# Patient Record
Sex: MALE
[De-identification: names, ages, dates, MRNs, and addresses within clinical notes are randomized per-mention and may not be internally consistent; named-entity substitution may affect disease eponyms.]

---

## 2023-02-14 ENCOUNTER — NON-APPOINTMENT (OUTPATIENT)
Age: 68
End: 2023-02-14

## 2023-02-15 ENCOUNTER — APPOINTMENT (OUTPATIENT)
Dept: UROLOGY | Facility: CLINIC | Age: 68
End: 2023-02-15
Payer: SELF-PAY

## 2023-02-15 VITALS
HEIGHT: 69 IN | WEIGHT: 215 LBS | TEMPERATURE: 98.2 F | SYSTOLIC BLOOD PRESSURE: 143 MMHG | BODY MASS INDEX: 31.84 KG/M2 | HEART RATE: 71 BPM | DIASTOLIC BLOOD PRESSURE: 81 MMHG

## 2023-02-15 PROCEDURE — 99204 OFFICE O/P NEW MOD 45 MIN: CPT

## 2023-02-15 NOTE — HISTORY OF PRESENT ILLNESS
[FreeTextEntry1] : 68yo male visiting from [place], here for second opinion for elevated PSA, BPH, and erectile dysfunction. PSA has historically been around 3.5-4, most recently over 5. Was advised to have MRI but can't get it before April in [place]. \par \par Also with ED. Was taking Cialis 5mg then switched 20mg needed. Has not tried Viagra. Unable to get erection with Cialis. Libido is normal. \par \par BPH on alfuzosin daily. Nocturia 1-3 times per night. Was told prostate is enlarged based on US in [place]. Symptoms not bothersome. \par \par Recently gained about 15 lbs over last 3 momnths [Nocturia] : nocturia [Erectile Dysfunction] : Erectile Dysfunction [None] : None

## 2023-02-15 NOTE — REVIEW OF SYSTEMS
[Feeling Poorly] : not feeling poorly [Feeling Tired] : not feeling tired [Recent Weight Gain (___ Lbs)] : recent [unfilled] ~Ulb weight gain [Nocturia] : nocturia [Erectile Dysfunction] : erectile dysfunction [Negative] : Psychiatric

## 2023-02-15 NOTE — ASSESSMENT
[FreeTextEntry1] : 68yo male, lives in [place] full time, visiting [facility] and interested in 2nd opinion for urological complaints\par \par Elevated PSA over 5\par Agree with MRI\par Discussed MRI indications and biopsy indications \par \par BPH on alfuzosin \par He would like to continue this \par \par ED, poor response to Cialis prn\par He will try Cialis daily \par Also discussed Viagra prn or intracavernosal injections\par \par F/u prn

## 2023-07-12 ENCOUNTER — APPOINTMENT (OUTPATIENT)
Dept: UROLOGY | Facility: CLINIC | Age: 68
End: 2023-07-12
Payer: SELF-PAY

## 2023-07-12 VITALS
DIASTOLIC BLOOD PRESSURE: 72 MMHG | TEMPERATURE: 98.1 F | WEIGHT: 215 LBS | BODY MASS INDEX: 32.58 KG/M2 | SYSTOLIC BLOOD PRESSURE: 136 MMHG | HEIGHT: 68 IN

## 2023-07-12 DIAGNOSIS — Z00.00 ENCOUNTER FOR GENERAL ADULT MEDICAL EXAMINATION W/OUT ABNORMAL FINDINGS: ICD-10-CM

## 2023-07-12 DIAGNOSIS — Z87.438 PERSONAL HISTORY OF OTHER DISEASES OF MALE GENITAL ORGANS: ICD-10-CM

## 2023-07-12 DIAGNOSIS — N52.02 CORPORO-VENOUS OCCLUSIVE ERECTILE DYSFUNCTION: ICD-10-CM

## 2023-07-12 DIAGNOSIS — Z80.1 FAMILY HISTORY OF MALIGNANT NEOPLASM OF TRACHEA, BRONCHUS AND LUNG: ICD-10-CM

## 2023-07-12 DIAGNOSIS — N48.89 OTHER SPECIFIED DISORDERS OF PENIS: ICD-10-CM

## 2023-07-12 DIAGNOSIS — R97.20 ELEVATED PROSTATE, SPECIFIC ANTIGEN [PSA]: ICD-10-CM

## 2023-07-12 DIAGNOSIS — N52.9 MALE ERECTILE DYSFUNCTION, UNSPECIFIED: ICD-10-CM

## 2023-07-12 DIAGNOSIS — N48.6 INDURATION PENIS PLASTICA: ICD-10-CM

## 2023-07-12 DIAGNOSIS — N13.8 BENIGN PROSTATIC HYPERPLASIA WITH LOWER URINARY TRACT SYMPMS: ICD-10-CM

## 2023-07-12 DIAGNOSIS — N40.1 BENIGN PROSTATIC HYPERPLASIA WITH LOWER URINARY TRACT SYMPMS: ICD-10-CM

## 2023-07-12 PROCEDURE — 99205 OFFICE O/P NEW HI 60 MIN: CPT | Mod: 25

## 2023-07-12 PROCEDURE — 93980 PENILE VASCULAR STUDY: CPT

## 2023-07-12 PROCEDURE — 54235 NJX CORPORA CAVERNOSA RX AGT: CPT

## 2023-07-12 RX ORDER — ALPROSTADIL 20 UG/ML
20 INJECTION, POWDER, LYOPHILIZED, FOR SOLUTION INTRACAVERNOUS
Qty: 6 | Refills: 11 | Status: ACTIVE | COMMUNITY
Start: 2023-07-12 | End: 1900-01-01

## 2023-07-12 RX ORDER — ALFUZOSIN HYDROCHLORIDE 10 MG/1
10 TABLET, EXTENDED RELEASE ORAL
Refills: 0 | Status: ACTIVE | COMMUNITY

## 2023-07-12 RX ORDER — TADALAFIL 5 MG/1
5 TABLET, FILM COATED ORAL
Refills: 0 | Status: ACTIVE | COMMUNITY

## 2023-07-13 NOTE — PHYSICAL EXAM
[General Appearance - Well Developed] : well developed [General Appearance - Well Nourished] : well nourished [Normal Appearance] : normal appearance [Well Groomed] : well groomed [General Appearance - In No Acute Distress] : no acute distress [Edema] : no peripheral edema [Respiration, Rhythm And Depth] : normal respiratory rhythm and effort [Exaggerated Use Of Accessory Muscles For Inspiration] : no accessory muscle use [Abdomen Soft] : soft [Abdomen Tenderness] : non-tender [Costovertebral Angle Tenderness] : no ~M costovertebral angle tenderness [Urethral Meatus] : meatus normal [Penis Abnormality] : normal circumcised penis [Urinary Bladder Findings] : the bladder was normal on palpation [Scrotum] : the scrotum was normal [Testes Mass (___cm)] : there were no testicular masses [No Prostate Nodules] : no prostate nodules [Normal Station and Gait] : the gait and station were normal for the patient's age [] : no rash [No Focal Deficits] : no focal deficits [Oriented To Time, Place, And Person] : oriented to person, place, and time [Affect] : the affect was normal [Mood] : the mood was normal [Not Anxious] : not anxious [No Palpable Adenopathy] : no palpable adenopathy [FreeTextEntry1] : Patient has a plaque which is palpable on the dorsal of the penis midshaft.

## 2023-07-13 NOTE — HISTORY OF PRESENT ILLNESS
[FreeTextEntry1] : Patient is a 68-year-old gentleman with chief complaint of total erectile dysfunction.  Last time he achieve intercourse was in July 2018.  Patient describes his erection is full penis no hardness no penetration.  The patient is able to have a partial erection on awakening.  He states that this erection is worse than sexually induced erections.  The patient has a score of 2 on the sexual health inventory for men.  He is heterosexual  patient has tried Viagra Cialis and Levitra at maximum dose without any benefit.  More recently he was noted to have an elevation of his PSA with a P RADS core of 3 on the MRI and had a prostate biopsy and awaiting the results.

## 2023-07-13 NOTE — ASSESSMENT
[FreeTextEntry1] : The patient scheduled this consultation to discuss the different treatment options available for his organic erectile dysfunction. The following note describes the conversation that was performed today during the consultation. \par \par  I reviewed the Patient History Form which the patient filled out, made sure that his ailment was organic erectile dysfunction and I discussed in detail with him all previously tried treatments for his ED. We had a thorough discussion about all of the alternatives available, and I made sure to include in our discussion pills such as Levitra/Viagra/Cialis, as well as penile self injectable therapies, MUSE (Medicated Urethral System for Erection), vacuum device, and penile implant.  I stressed the risks and benefits and pros and cons of each of these options extensively. A power point presentation was also used to illustrate each treatment option. The patient was also provided with a packet of written information as well as a list of patients to speak to on the phone.  \par \par In discussing penile implant surgery, the patient was made aware of the different types of penile implants- including semirigid devices, 2-piece or Ambicor (AMS) devices, and the inflatable penile prosthesis with 3 components. I went on to mention that there are 2 brands of devices, Coloplast and AMS, and that the AMS is impregnated with antibiotic (inhibizone), and the Coloplast is dipped then coated with an antibiotic. I also referred him to my website in order to obtain additional information about the types of implants available.  He felt he would defer to my judgment as to which device to use.\par \par I also described the highlights and benefits of the "No-Touch" surgical technique and outcome data including number of procedures previously performed and updated rate of infection. In this initial discussion of the penile implant option, I made sure we had a very long and evette discussion about the risks.  I stated that, first and foremost, infection is the most dreaded risk and complication, which range in incidence from 1-3% of all cases performed in the [place], but that in my hands, using the "No-Touch" technique the incidence of infection is less than 1%.  I stated that should infection occur, the entire device would need to be removed, which typically happens in the first several weeks after surgery.  I explained that should this occur, there would likely be corporal fibrosis, scarring, penile shortening and even penile necrosis and disfigurement.  I said that while I would do absolutely everything possible to reduce and mitigate this risk, if it occur, the device would have to be removed, and then a salvage procedure with a semi-rigid implant possibly done, or the device would have to be removed with delayed re-implantation, or simply avoid future surgery completely.  I explained what this salvage procedure is, and that a new implant could be placed in the same setting with a complex irrigation of antibiotics and saline lavage, but that the infection risk at this salvage procedure is even higher, up to 30%. Furthermore the possible need for hospitalization, prolonged intravenous antibiotics and need further additional surgery was also discussed.  The patient was informed that if the salvage operation failed or if the infected implant were to be removed completely that significant shortening of the penis would occur making implantation of another device very difficult with very poor outcome and patient satisfaction. I explained that this is a real and significant risk that has to be weighed and considered.\par \par Next I expounded on the other risks of the operation.  These include injury to the urethra or bladder, and should these occur, the operation would have to be altered or aborted.  I explained that very rarely, vascular injury and bleeding can happen, and if iliac vein injury occurs from reservoir placement, this could be catastrophic and result in major blood loss and theoretically risk of leg loss in severe instances.  \par \par I went on to discuss that after the implant is placed, penile shortening could likely occur, and this is up to 1-2cm total.  Some of this is due to lack of glans engorgement, though MUSE could be used post-operatively to reduce this factor.  Next I also explained the risk of dissatisfaction with the cosmetic or functional result of the device, meaning that he could simply be unhappy with the result.  Some people find that while they have a good full erection, they have changes in sensation, difficulty obtaining an orgasm, and dissatisfaction with sex in general.  I made sure he verbalized and demonstrated a good understanding of these points.\par \par Next, I explained the risk of device breakage or failure, and future operations might be needed should this occur to fix the device tubing breakages with fluid leaks.  There is also a risk of auto-inflation, and even inability to successfully use the device due to technical considerations and inability to use or find the pump.  I did state that I would be available to him to teach him and train him to use his device, and also available to treat any other issue mentioned above such as device breakage or auto-inflation.\par \par Next we discussed the fact that rarely further minor surgery may be needed to make final adjustments to the penile implant. Reasons for this would be to adjust the length of the cylinders, reposition the pump or location of the reservoir. \par \par Prior to scheduling surgery the patient was asked to read the material which is provided to him today, to see a cardiologist to obtain a medical clearance, to visit our website [email or web address]   to obtain additional information, to call patients who were previously implanted and to discuss his options with his partner. Before leaving the consultation, I made sure he verbalized understanding all the risk and benefits, and pros and cons of surgery.  He had the ability to ask questions, and I also explained to him what to expect from the surgery.  I made sure he had access to literature to read, and offered him the ability to speak to prior patients of mine to get a sense of what to expect, and that these reports would hopefully be as unbiased as possible. If he remains interested in having an implant he understands that he will need to schedule a penile Duplex ultrasound study during which measurements of the penis will be made.\par \par PENILE INJECTION TEST:\par \par NETO BATRES \par 07/12/2023 \par \par The patient was placed supine on the procedure table.  The left side of the penis was prepped with alcohol, and the patient received 20 mcg @ 1 cc of Alprostadil.\par The patient tolerated the injection well.  No bleeding occurred at the injection site.   \par \par The patient was examined at 5, 10, 30 and 45 minutes interval post injection:  \par The patient’s penis was:    13 cm stretched \par Deformity: Yes\par Narrowing: Mid shaft\par An “hour glass deformity”: Midshaft\par Curvature: Upward curvature\par \par Post Injection Erectile Response:\par At 5 minutes: 20    % rigid erection was noted. \par At 15 minutes: 40% rigidity.  \par At 30 minutes:   60% rigidity. \par Spontaneous detumescence occurred after 60 minutes.   \par The patient was discharged with a soft erection and was advised to call should his erection become more rigid.\par \par Post response evaluation by the patient:\par The patient described that this erection was better than his sexually induced erections.  \par The erection was not adequate for vaginal penetration.\par Impression:         Severe organic erectile dysfunction.\par \par Recommendation: Course of penile injection therapy or insertion of penile implant insertion.\par \par SONOGRAPHY WITH PULSED DOPPLER ANALYSIS:\par Procedure description:\par The flaccid penis was scanned with the 18 MHz probe and images obtained longitudinally.  \par The diameters of the corporal arteries were measured:\par \par The right cavernosal artery measured: 0.78 mm\par The left cavernosal artery measured: 0.77   mm\par \par Following intracavernous injection of alprostadil    40   microgram/ml in 1   ml, the penis was rescanned and the diameter of the cavernosal arteries were measured again to assess distensibility in response to the vasoactive medication. \par (A 100% increase in diameter is normally expected.)\par \par The left cavernosal artery measured: 0.73 mm\par The right cavernosal artery measured: 1.29 mm\par \par Pulsed Doppler analysis:\par Each of the selective imaged arteries underwent penile Doppler analysis with generation of waveform. \par The peak velocity data of the cavernosal arteries is tabulated below: \par (A velocity of 35 cm/s or more is normally expected.)  \par Resistive index parameters were obtained bilaterally (normal is 100%): \par \par Results: \par Left cavernosal artery peak systolic velocity at 15 minutes: 72.2 centimeters per second\par Let cavernosal end-diastolic velocity at 15 minutes:           6.53     centimeters per second\par Left cavernosal artery resistive index:    91   % \par \par Right cavernosal artery peak systolic velocity at 15 minutes: 28.4 centimeters per second \par Right end-diastolic velocity at 15 minutes:                            5.44     centimeters per second\par Right cavernosal artery resistive index:    81% \par \par Spontaneous detumescence occurred after 60  minutes\par The patient was discharged with a soft erection and was advised to call should an erection persist for more than 4 hours.  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    \par \par After the Duplex study was terminated, I sat with the patient for 45 minutes and I explained to him the findings of the study. I also discussed his diagnosis with him as well as the recommended course of action. He understands the reason why he has ED and agrees with the plan of action.\par \par \par   \par \par \par \par \par  \par \par \par